# Patient Record
Sex: MALE | Race: WHITE | NOT HISPANIC OR LATINO | URBAN - METROPOLITAN AREA
[De-identification: names, ages, dates, MRNs, and addresses within clinical notes are randomized per-mention and may not be internally consistent; named-entity substitution may affect disease eponyms.]

---

## 2017-07-20 ENCOUNTER — EMERGENCY (EMERGENCY)
Facility: HOSPITAL | Age: 32
LOS: 1 days | Discharge: HOME | End: 2017-07-20
Admitting: EMERGENCY MEDICINE
Payer: MEDICAID

## 2017-07-20 VITALS
DIASTOLIC BLOOD PRESSURE: 75 MMHG | HEART RATE: 84 BPM | WEIGHT: 259.93 LBS | SYSTOLIC BLOOD PRESSURE: 155 MMHG | HEIGHT: 75 IN | OXYGEN SATURATION: 97 % | RESPIRATION RATE: 18 BRPM | TEMPERATURE: 103 F

## 2017-07-20 VITALS — RESPIRATION RATE: 17 BRPM | TEMPERATURE: 100 F | OXYGEN SATURATION: 98 % | HEART RATE: 72 BPM

## 2017-07-20 DIAGNOSIS — N17.9 ACUTE KIDNEY FAILURE, UNSPECIFIED: ICD-10-CM

## 2017-07-20 DIAGNOSIS — B34.9 VIRAL INFECTION, UNSPECIFIED: ICD-10-CM

## 2017-07-20 DIAGNOSIS — R50.9 FEVER, UNSPECIFIED: ICD-10-CM

## 2017-07-20 DIAGNOSIS — M54.2 CERVICALGIA: ICD-10-CM

## 2017-07-20 LAB
ALBUMIN SERPL ELPH-MCNC: 3.7 G/DL — SIGNIFICANT CHANGE UP (ref 3.4–5)
ALP SERPL-CCNC: 59 U/L — SIGNIFICANT CHANGE UP (ref 40–120)
ALT FLD-CCNC: 67 U/L — HIGH (ref 12–42)
ANION GAP SERPL CALC-SCNC: 7 MMOL/L — LOW (ref 9–16)
APPEARANCE UR: CLEAR — SIGNIFICANT CHANGE UP
AST SERPL-CCNC: 54 U/L — HIGH (ref 15–37)
BILIRUB SERPL-MCNC: 0.9 MG/DL — SIGNIFICANT CHANGE UP (ref 0.2–1.2)
BILIRUB UR-MCNC: NEGATIVE — SIGNIFICANT CHANGE UP
BUN SERPL-MCNC: 10 MG/DL — SIGNIFICANT CHANGE UP (ref 7–23)
CALCIUM SERPL-MCNC: 9.8 MG/DL — SIGNIFICANT CHANGE UP (ref 8.5–10.5)
CHLORIDE SERPL-SCNC: 103 MMOL/L — SIGNIFICANT CHANGE UP (ref 96–108)
CO2 SERPL-SCNC: 27 MMOL/L — SIGNIFICANT CHANGE UP (ref 22–31)
COLOR SPEC: YELLOW — SIGNIFICANT CHANGE UP
CREAT SERPL-MCNC: 1.51 MG/DL — HIGH (ref 0.5–1.3)
DIFF PNL FLD: NEGATIVE — SIGNIFICANT CHANGE UP
GLUCOSE SERPL-MCNC: 116 MG/DL — HIGH (ref 70–99)
GLUCOSE UR QL: NEGATIVE — SIGNIFICANT CHANGE UP
HCT VFR BLD CALC: 44.5 % — SIGNIFICANT CHANGE UP (ref 39–50)
HGB BLD-MCNC: 15.2 G/DL — SIGNIFICANT CHANGE UP (ref 13–17)
KETONES UR-MCNC: NEGATIVE — SIGNIFICANT CHANGE UP
LACTATE SERPL-SCNC: 0.8 MMOL/L — SIGNIFICANT CHANGE UP (ref 0.4–2)
LEUKOCYTE ESTERASE UR-ACNC: NEGATIVE — SIGNIFICANT CHANGE UP
MCHC RBC-ENTMCNC: 31.2 PG — SIGNIFICANT CHANGE UP (ref 27–34)
MCHC RBC-ENTMCNC: 34.2 G/DL — SIGNIFICANT CHANGE UP (ref 32–36)
MCV RBC AUTO: 91.4 FL — SIGNIFICANT CHANGE UP (ref 80–100)
NITRITE UR-MCNC: NEGATIVE — SIGNIFICANT CHANGE UP
PCP SPEC-MCNC: SIGNIFICANT CHANGE UP
PH UR: 7 — SIGNIFICANT CHANGE UP (ref 5–8)
PLATELET # BLD AUTO: 161 K/UL — SIGNIFICANT CHANGE UP (ref 150–400)
POTASSIUM SERPL-MCNC: 4.9 MMOL/L — SIGNIFICANT CHANGE UP (ref 3.5–5.3)
POTASSIUM SERPL-SCNC: 4.9 MMOL/L — SIGNIFICANT CHANGE UP (ref 3.5–5.3)
PROT SERPL-MCNC: 7.6 G/DL — SIGNIFICANT CHANGE UP (ref 6.4–8.2)
PROT UR-MCNC: NEGATIVE MG/DL — SIGNIFICANT CHANGE UP
RBC # BLD: 4.87 M/UL — SIGNIFICANT CHANGE UP (ref 4.2–5.8)
RBC # FLD: 14.3 % — SIGNIFICANT CHANGE UP (ref 10.3–16.9)
SODIUM SERPL-SCNC: 137 MMOL/L — SIGNIFICANT CHANGE UP (ref 132–145)
SP GR SPEC: <=1.005 — SIGNIFICANT CHANGE UP (ref 1–1.03)
UROBILINOGEN FLD QL: 0.2 E.U./DL — SIGNIFICANT CHANGE UP
WBC # BLD: 7 K/UL — SIGNIFICANT CHANGE UP (ref 3.8–10.5)
WBC # FLD AUTO: 7 K/UL — SIGNIFICANT CHANGE UP (ref 3.8–10.5)

## 2017-07-20 PROCEDURE — 99284 EMERGENCY DEPT VISIT MOD MDM: CPT | Mod: 25

## 2017-07-20 PROCEDURE — 71020: CPT | Mod: 26

## 2017-07-20 RX ORDER — OXYCODONE AND ACETAMINOPHEN 5; 325 MG/1; MG/1
1 TABLET ORAL ONCE
Qty: 0 | Refills: 0 | Status: DISCONTINUED | OUTPATIENT
Start: 2017-07-20 | End: 2017-07-20

## 2017-07-20 RX ORDER — IBUPROFEN 200 MG
800 TABLET ORAL ONCE
Qty: 0 | Refills: 0 | Status: COMPLETED | OUTPATIENT
Start: 2017-07-20 | End: 2017-07-20

## 2017-07-20 RX ORDER — ONDANSETRON 8 MG/1
1 TABLET, FILM COATED ORAL
Qty: 12 | Refills: 0 | OUTPATIENT
Start: 2017-07-20

## 2017-07-20 RX ORDER — ACETAMINOPHEN 500 MG
975 TABLET ORAL ONCE
Qty: 0 | Refills: 0 | Status: DISCONTINUED | OUTPATIENT
Start: 2017-07-20 | End: 2017-07-20

## 2017-07-20 RX ORDER — ONDANSETRON 8 MG/1
4 TABLET, FILM COATED ORAL ONCE
Qty: 0 | Refills: 0 | Status: COMPLETED | OUTPATIENT
Start: 2017-07-20 | End: 2017-07-20

## 2017-07-20 RX ORDER — SODIUM CHLORIDE 9 MG/ML
2000 INJECTION INTRAMUSCULAR; INTRAVENOUS; SUBCUTANEOUS ONCE
Qty: 0 | Refills: 0 | Status: COMPLETED | OUTPATIENT
Start: 2017-07-20 | End: 2017-07-20

## 2017-07-20 RX ORDER — IBUPROFEN 200 MG
1 TABLET ORAL
Qty: 20 | Refills: 0 | OUTPATIENT
Start: 2017-07-20 | End: 2017-08-19

## 2017-07-20 RX ADMIN — OXYCODONE AND ACETAMINOPHEN 1 TABLET(S): 5; 325 TABLET ORAL at 18:05

## 2017-07-20 RX ADMIN — SODIUM CHLORIDE 4000 MILLILITER(S): 9 INJECTION INTRAMUSCULAR; INTRAVENOUS; SUBCUTANEOUS at 16:18

## 2017-07-20 RX ADMIN — Medication 800 MILLIGRAM(S): at 17:59

## 2017-07-20 RX ADMIN — ONDANSETRON 4 MILLIGRAM(S): 8 TABLET, FILM COATED ORAL at 16:18

## 2017-07-20 RX ADMIN — Medication 800 MILLIGRAM(S): at 16:25

## 2017-07-20 NOTE — ED ADULT NURSE NOTE - OBJECTIVE STATEMENT
x1 day  states groin pain started radiating to lower back and traveling up. +nausea no emesis no diarrhea, decreased po intake. neck pain and headache, full rom no other neuro deficits noted.

## 2017-07-20 NOTE — ED PROVIDER NOTE - PHYSICAL EXAMINATION
Gen - NAD, comfortable in stretcher, non-toxic appearing, speaking in full sentences   Skin - warm, dry, intact, no rash noted   HEENT - AT/NC, PERRL, EOMI, no conjunctival injection, o/p clear with no erythema, edema, or exudate, uvula midline, airway patent, neck supple, no mid line tenderness or step off, no JVD or carotid bruits b/l, no palpable nodes, negative brudzinski's and kernig's   CV - S1S2, R/R/R  Resp - respiration non-labored, CTAB, symmetric bs b/l, no r/r/w  GI - NABS, soft, ND, NT, no rebound or guarding, no CVAT b/l   MS - w/w/p, no c/c/e, calves supple and NT  Neuro - AxOx3, no focal neuro deficits, CN II-XII grossly intact, ambulatory without gait disturbance

## 2017-07-20 NOTE — ED PROVIDER NOTE - MEDICAL DECISION MAKING DETAILS
pt with non specific sx with fever AFVSS at time of d/c, pt non-toxic appearing and hemodynamically stable, results, ddx, and f/u plans discussed with pt at bedside, d/c'd home to f/u with PMD, strict return precautions discussed, prompt return to ER for any worsening or new sx, pt verbalized understanding. pt with non specific sx with fever, non toxic appearing ,febrile to 102.7, +back/neck pain, but resolved s/p motrin and IVF, no meningismus signs, no source of infxn noted, no leukocytosis, AFVSS at time of d/c, pt non-toxic appearing and hemodynamically stable, results, ddx, and f/u plans discussed with pt at bedside, d/c'd home to f/u with PMD, strict return precautions discussed, prompt return to ER for any worsening or new sx, pt verbalized understanding.

## 2017-07-20 NOTE — ED ADULT NURSE NOTE - CHPI ED SYMPTOMS NEG
no vomiting/no numbness/no dizziness/no confusion/no loss of consciousness/no change in level of consciousness/no blurred vision

## 2017-07-20 NOTE — ED PROVIDER NOTE - OBJECTIVE STATEMENT
30 yo M with no known PMHx, presenting c/o fever, lower back pain, neck pain, and generalized malaise.  Pt reports binge drinking 3-4 days ago with hard liquors.  Noted lower back and neck discomfort the next day with associated fever (Tmax 102) at home and generalized weakness. Denies sore throat, cough, SOB, CP, palpitations, wheezing, abdominal pain, N/V/D/C, change in urinary/bowel function, dysuria, hematuria, flank pain, photophobia, phonophobia, rash, HA, and dizziness.  No recent travel or sick contact noted. Took tylenol around noon for fever

## 2017-07-20 NOTE — ED PROVIDER NOTE - CARE PLAN
Principal Discharge DX:	Febrile illness  Secondary Diagnosis:	Acute kidney injury  Secondary Diagnosis:	Viral syndrome

## 2017-07-20 NOTE — ED PROVIDER NOTE - DIAGNOSTIC INTERPRETATION
Xray (wet reads) interpreted by MILES HERNANDEZ   CXR - Cardiac silhouette, mediastinal and hilar contours wnl, no acute consolidation, infiltrate, effusion, or PTX. No bony abnormalities noted

## 2017-07-20 NOTE — ED ADULT TRIAGE NOTE - CHIEF COMPLAINT QUOTE
c/o fever, nausea, chills, body aches, weakness and fatigue x1 day subjective fever at home. Took tylenol at noon today. Decreased PO intake, denies any cough or sick contact.

## 2017-08-17 ENCOUNTER — EMERGENCY (EMERGENCY)
Facility: HOSPITAL | Age: 32
LOS: 1 days | Discharge: HOME | End: 2017-08-17
Admitting: EMERGENCY MEDICINE
Payer: MEDICAID

## 2017-08-17 VITALS
HEART RATE: 92 BPM | RESPIRATION RATE: 15 BRPM | OXYGEN SATURATION: 97 % | HEIGHT: 75 IN | SYSTOLIC BLOOD PRESSURE: 129 MMHG | WEIGHT: 259.93 LBS | DIASTOLIC BLOOD PRESSURE: 80 MMHG | TEMPERATURE: 101 F

## 2017-08-17 VITALS
TEMPERATURE: 100 F | RESPIRATION RATE: 18 BRPM | OXYGEN SATURATION: 96 % | DIASTOLIC BLOOD PRESSURE: 69 MMHG | SYSTOLIC BLOOD PRESSURE: 142 MMHG | HEART RATE: 90 BPM

## 2017-08-17 LAB
ALBUMIN SERPL ELPH-MCNC: 3.6 G/DL — SIGNIFICANT CHANGE UP (ref 3.4–5)
ALP SERPL-CCNC: 52 U/L — SIGNIFICANT CHANGE UP (ref 40–120)
ALT FLD-CCNC: 106 U/L — HIGH (ref 12–42)
ANION GAP SERPL CALC-SCNC: 9 MMOL/L — SIGNIFICANT CHANGE UP (ref 9–16)
APPEARANCE UR: CLEAR — SIGNIFICANT CHANGE UP
AST SERPL-CCNC: 76 U/L — HIGH (ref 15–37)
BASOPHILS NFR BLD AUTO: 0.1 % — SIGNIFICANT CHANGE UP (ref 0–2)
BILIRUB SERPL-MCNC: 0.4 MG/DL — SIGNIFICANT CHANGE UP (ref 0.2–1.2)
BILIRUB UR-MCNC: NEGATIVE — SIGNIFICANT CHANGE UP
BUN SERPL-MCNC: 14 MG/DL — SIGNIFICANT CHANGE UP (ref 7–23)
CALCIUM SERPL-MCNC: 10 MG/DL — SIGNIFICANT CHANGE UP (ref 8.5–10.5)
CHLORIDE SERPL-SCNC: 102 MMOL/L — SIGNIFICANT CHANGE UP (ref 96–108)
CK SERPL-CCNC: 2023 U/L — HIGH (ref 39–308)
CO2 SERPL-SCNC: 27 MMOL/L — SIGNIFICANT CHANGE UP (ref 22–31)
COLOR SPEC: YELLOW — SIGNIFICANT CHANGE UP
CREAT SERPL-MCNC: 1.55 MG/DL — HIGH (ref 0.5–1.3)
DIFF PNL FLD: (no result)
EOSINOPHIL NFR BLD AUTO: 0.3 % — SIGNIFICANT CHANGE UP (ref 0–6)
GLUCOSE SERPL-MCNC: 99 MG/DL — SIGNIFICANT CHANGE UP (ref 70–99)
GLUCOSE UR QL: NEGATIVE — SIGNIFICANT CHANGE UP
HCT VFR BLD CALC: 42.1 % — SIGNIFICANT CHANGE UP (ref 39–50)
HGB BLD-MCNC: 14.2 G/DL — SIGNIFICANT CHANGE UP (ref 13–17)
IMM GRANULOCYTES NFR BLD AUTO: 0.2 % — SIGNIFICANT CHANGE UP (ref 0–1.5)
KETONES UR-MCNC: NEGATIVE — SIGNIFICANT CHANGE UP
LEUKOCYTE ESTERASE UR-ACNC: NEGATIVE — SIGNIFICANT CHANGE UP
LYMPHOCYTES # BLD AUTO: 12.4 % — LOW (ref 13–44)
MAGNESIUM SERPL-MCNC: 2.1 MG/DL — SIGNIFICANT CHANGE UP (ref 1.6–2.6)
MCHC RBC-ENTMCNC: 31.1 PG — SIGNIFICANT CHANGE UP (ref 27–34)
MCHC RBC-ENTMCNC: 33.7 G/DL — SIGNIFICANT CHANGE UP (ref 32–36)
MCV RBC AUTO: 92.3 FL — SIGNIFICANT CHANGE UP (ref 80–100)
MONOCYTES NFR BLD AUTO: 11.1 % — SIGNIFICANT CHANGE UP (ref 2–14)
NEUTROPHILS NFR BLD AUTO: 75.9 % — SIGNIFICANT CHANGE UP (ref 43–77)
NITRITE UR-MCNC: NEGATIVE — SIGNIFICANT CHANGE UP
PCP SPEC-MCNC: SIGNIFICANT CHANGE UP
PH UR: 7.5 — SIGNIFICANT CHANGE UP (ref 5–8)
PLATELET # BLD AUTO: 146 K/UL — LOW (ref 150–400)
POTASSIUM SERPL-MCNC: 4.1 MMOL/L — SIGNIFICANT CHANGE UP (ref 3.5–5.3)
POTASSIUM SERPL-SCNC: 4.1 MMOL/L — SIGNIFICANT CHANGE UP (ref 3.5–5.3)
PROT SERPL-MCNC: 7.8 G/DL — SIGNIFICANT CHANGE UP (ref 6.4–8.2)
PROT UR-MCNC: 30 MG/DL
RBC # BLD: 4.56 M/UL — SIGNIFICANT CHANGE UP (ref 4.2–5.8)
RBC # FLD: 14.1 % — SIGNIFICANT CHANGE UP (ref 10.3–16.9)
SODIUM SERPL-SCNC: 138 MMOL/L — SIGNIFICANT CHANGE UP (ref 132–145)
SP GR SPEC: 1.01 — SIGNIFICANT CHANGE UP (ref 1–1.03)
UROBILINOGEN FLD QL: 0.2 E.U./DL — SIGNIFICANT CHANGE UP
WBC # BLD: 8.7 K/UL — SIGNIFICANT CHANGE UP (ref 3.8–10.5)
WBC # FLD AUTO: 8.7 K/UL — SIGNIFICANT CHANGE UP (ref 3.8–10.5)

## 2017-08-17 PROCEDURE — 99284 EMERGENCY DEPT VISIT MOD MDM: CPT | Mod: 25

## 2017-08-17 PROCEDURE — 71020: CPT | Mod: 26

## 2017-08-17 RX ORDER — SODIUM CHLORIDE 9 MG/ML
1000 INJECTION INTRAMUSCULAR; INTRAVENOUS; SUBCUTANEOUS ONCE
Qty: 0 | Refills: 0 | Status: COMPLETED | OUTPATIENT
Start: 2017-08-17 | End: 2017-08-17

## 2017-08-17 RX ORDER — KETOROLAC TROMETHAMINE 30 MG/ML
30 SYRINGE (ML) INJECTION ONCE
Qty: 0 | Refills: 0 | Status: DISCONTINUED | OUTPATIENT
Start: 2017-08-17 | End: 2017-08-17

## 2017-08-17 RX ADMIN — SODIUM CHLORIDE 2000 MILLILITER(S): 9 INJECTION INTRAMUSCULAR; INTRAVENOUS; SUBCUTANEOUS at 05:54

## 2017-08-17 RX ADMIN — SODIUM CHLORIDE 2000 MILLILITER(S): 9 INJECTION INTRAMUSCULAR; INTRAVENOUS; SUBCUTANEOUS at 05:11

## 2017-08-17 RX ADMIN — Medication 30 MILLIGRAM(S): at 05:11

## 2017-08-17 NOTE — ED PROVIDER NOTE - MEDICAL DECISION MAKING DETAILS
pt with uri, viral syndrome; labs with decreased lymphocytes; slightly elevated ck- cr 1.8; pt given 2 L and is urinating normally. advised pmd f/u and supportive care. cxr normal

## 2017-08-17 NOTE — ED ADULT NURSE NOTE - OBJECTIVE STATEMENT
friday   cough brown phlegm   chills 100.0-102  eohedra x 2 weeks   headadhe   no N/V/D. Pt came in c/o fever/chills between 100.0-100.2 for the past week. Pt reports productive cough with brown phlegm. Pt denies any N/V/D, no CP/SOB. Pt reports lower back pain radiating to groin and neck 5/10. Pt reports using new weight loss drug called Ephedra for the past x2 weeks and thinks it may be causing muscle/body aches.

## 2017-08-17 NOTE — ED PROVIDER NOTE - OBJECTIVE STATEMENT
32 yo M with no known PMHx, presenting c/o fever, lower back pain, neck pain, and generalized malaise; +cough.  Pt reports binge drinking 3-4 days ago with hard liquors.  Noted lower back and neck discomfort the next day with associated fever (Tmax 102) at home and generalized weakness. Denies sore throat, SOB, CP, palpitations, wheezing, abdominal pain, N/V/D/C, change in urinary/bowel function, dysuria, hematuria, flank pain, photophobia, phonophobia, rash, HA, and dizziness.  No recent travel or sick contact noted.  Pt also reports taking ephedra and working out 2 times daily.

## 2017-08-17 NOTE — ED ADULT TRIAGE NOTE - CHIEF COMPLAINT QUOTE
Pt presents to ED with c/o fatigue, dehydration, dry cough and fevers x several days. Patient has been tolerating PO- alternating between pedialyte and water. Reports taking ephedra recently, walking around in the heat and working out 2 x daily.

## 2017-08-18 ENCOUNTER — EMERGENCY (EMERGENCY)
Facility: HOSPITAL | Age: 32
LOS: 1 days | Discharge: HOME | End: 2017-08-18
Attending: EMERGENCY MEDICINE | Admitting: EMERGENCY MEDICINE
Payer: MEDICAID

## 2017-08-18 VITALS
HEART RATE: 95 BPM | OXYGEN SATURATION: 95 % | SYSTOLIC BLOOD PRESSURE: 144 MMHG | DIASTOLIC BLOOD PRESSURE: 98 MMHG | RESPIRATION RATE: 18 BRPM | TEMPERATURE: 100 F

## 2017-08-18 DIAGNOSIS — M54.5 LOW BACK PAIN: ICD-10-CM

## 2017-08-18 DIAGNOSIS — R05 COUGH: ICD-10-CM

## 2017-08-18 DIAGNOSIS — Z79.1 LONG TERM (CURRENT) USE OF NON-STEROIDAL ANTI-INFLAMMATORIES (NSAID): ICD-10-CM

## 2017-08-18 DIAGNOSIS — Z79.899 OTHER LONG TERM (CURRENT) DRUG THERAPY: ICD-10-CM

## 2017-08-18 DIAGNOSIS — J18.9 PNEUMONIA, UNSPECIFIED ORGANISM: ICD-10-CM

## 2017-08-18 LAB
ALBUMIN SERPL ELPH-MCNC: 3 G/DL — LOW (ref 3.4–5)
ALP SERPL-CCNC: 35 U/L — LOW (ref 40–120)
ALT FLD-CCNC: 59 U/L — HIGH (ref 12–42)
ANION GAP SERPL CALC-SCNC: 9 MMOL/L — SIGNIFICANT CHANGE UP (ref 9–16)
AST SERPL-CCNC: 50 U/L — HIGH (ref 15–37)
BASOPHILS NFR BLD AUTO: 0 % — SIGNIFICANT CHANGE UP (ref 0–2)
BILIRUB SERPL-MCNC: 0.4 MG/DL — SIGNIFICANT CHANGE UP (ref 0.2–1.2)
BUN SERPL-MCNC: 11 MG/DL — SIGNIFICANT CHANGE UP (ref 7–23)
CALCIUM SERPL-MCNC: 9.4 MG/DL — SIGNIFICANT CHANGE UP (ref 8.5–10.5)
CHLORIDE SERPL-SCNC: 103 MMOL/L — SIGNIFICANT CHANGE UP (ref 96–108)
CO2 SERPL-SCNC: 25 MMOL/L — SIGNIFICANT CHANGE UP (ref 22–31)
CREAT SERPL-MCNC: 1.42 MG/DL — HIGH (ref 0.5–1.3)
EOSINOPHIL NFR BLD AUTO: 0 % — SIGNIFICANT CHANGE UP (ref 0–6)
GLUCOSE SERPL-MCNC: 101 MG/DL — HIGH (ref 70–99)
HCT VFR BLD CALC: 40.4 % — SIGNIFICANT CHANGE UP (ref 39–50)
HGB BLD-MCNC: 13.5 G/DL — SIGNIFICANT CHANGE UP (ref 13–17)
HIV 1 & 2 AB SERPL IA.RAPID: SIGNIFICANT CHANGE UP
IMM GRANULOCYTES NFR BLD AUTO: 23 % — SIGNIFICANT CHANGE UP (ref 0–1.5)
LYMPHOCYTES # BLD AUTO: 12 % — SIGNIFICANT CHANGE UP (ref 13–44)
MCHC RBC-ENTMCNC: 30.8 PG — SIGNIFICANT CHANGE UP (ref 27–34)
MCHC RBC-ENTMCNC: 33.4 G/DL — SIGNIFICANT CHANGE UP (ref 32–36)
MCV RBC AUTO: 92.2 FL — SIGNIFICANT CHANGE UP (ref 80–100)
MONOCYTES NFR BLD AUTO: 3 % — SIGNIFICANT CHANGE UP (ref 2–14)
NEUTROPHILS NFR BLD AUTO: 84.2 % — HIGH (ref 43–77)
PLATELET # BLD AUTO: 157 K/UL — SIGNIFICANT CHANGE UP (ref 150–400)
POTASSIUM SERPL-MCNC: 3.8 MMOL/L — SIGNIFICANT CHANGE UP (ref 3.5–5.3)
POTASSIUM SERPL-SCNC: 3.8 MMOL/L — SIGNIFICANT CHANGE UP (ref 3.5–5.3)
PROT SERPL-MCNC: 7.1 G/DL — SIGNIFICANT CHANGE UP (ref 6.4–8.2)
RBC # BLD: 4.38 M/UL — SIGNIFICANT CHANGE UP (ref 4.2–5.8)
RBC # FLD: 14.2 % — SIGNIFICANT CHANGE UP (ref 10.3–16.9)
SODIUM SERPL-SCNC: 137 MMOL/L — SIGNIFICANT CHANGE UP (ref 132–145)
WBC # BLD: 7.2 K/UL — SIGNIFICANT CHANGE UP (ref 3.8–10.5)
WBC # FLD AUTO: 7.2 K/UL — SIGNIFICANT CHANGE UP (ref 3.8–10.5)

## 2017-08-18 PROCEDURE — 99284 EMERGENCY DEPT VISIT MOD MDM: CPT

## 2017-08-18 RX ORDER — SODIUM CHLORIDE 9 MG/ML
1000 INJECTION INTRAMUSCULAR; INTRAVENOUS; SUBCUTANEOUS ONCE
Qty: 0 | Refills: 0 | Status: COMPLETED | OUTPATIENT
Start: 2017-08-18 | End: 2017-08-18

## 2017-08-18 RX ADMIN — SODIUM CHLORIDE 1000 MILLILITER(S): 9 INJECTION INTRAMUSCULAR; INTRAVENOUS; SUBCUTANEOUS at 18:02

## 2017-08-18 RX ADMIN — Medication 100 MILLIGRAM(S): at 18:01

## 2017-08-18 NOTE — ED PROVIDER NOTE - MEDICAL DECISION MAKING DETAILS
Pt presents with worsening productive cough since last night. Will give fluids, IV Levaquin, and Tessalon Pearle. Labs ordered. Re-evaluate. Pt presents with worsening productive cough since last night. Will treat as PNA given recent over-read on pt's CXR.  Will give fluids, IV Levaquin, and Tessalon Pearle. Labs ordered. Re-evaluate.    Labs improving.  Cr and LFTs improving and may be elevated at baseline given pt's body habitus.  Clinically stable for out pt treatment.  Rx sent in.  Emergent return precautions discussed.  PCP f/u instructed.

## 2017-08-18 NOTE — ED PROVIDER NOTE - CONSTITUTIONAL, MLM
normal... Well appearing, well nourished, awake, alert, oriented to person, place, time/situation and in no apparent distress. Extremely muscular, awake, alert, oriented to person, place, time/situation and in no apparent distress. Well developed/muscular, awake, alert, oriented to person, place, time/situation and in no apparent distress.

## 2017-08-18 NOTE — ED PROVIDER NOTE - OBJECTIVE STATEMENT
32 yo M with no PMHx seen in this ED yesterday for cough, back pain and fever. Pt was diagnosed with viral syndrome but chest x-ray was read by radiologist the next day as early pneumonia. Pt is complaining of worsening productive cough with brown phlegm since last night after discharge. +Subjective fever, chills, low back pain and HA. Pt called PMD who is not in town and referred to ED.

## 2017-08-18 NOTE — ED PROVIDER NOTE - RESPIRATORY, MLM
Mild rhonchi on left side > right side. Mild rhonchi in base, left side > right side.  No tachypnea or hypoxia.  Normal RR.

## 2017-08-21 DIAGNOSIS — Z79.899 OTHER LONG TERM (CURRENT) DRUG THERAPY: ICD-10-CM

## 2017-08-21 DIAGNOSIS — M62.82 RHABDOMYOLYSIS: ICD-10-CM

## 2017-08-21 DIAGNOSIS — M54.5 LOW BACK PAIN: ICD-10-CM

## 2017-08-21 DIAGNOSIS — R50.9 FEVER, UNSPECIFIED: ICD-10-CM

## 2017-08-21 DIAGNOSIS — B34.9 VIRAL INFECTION, UNSPECIFIED: ICD-10-CM

## 2017-08-21 DIAGNOSIS — Z79.1 LONG TERM (CURRENT) USE OF NON-STEROIDAL ANTI-INFLAMMATORIES (NSAID): ICD-10-CM

## 2017-08-23 NOTE — ED ADULT TRIAGE NOTE - ACCOMPANIED BY
Call placed to pt regarding test result of ultrasound of aorta  Pt verbalized understanding of results and is relieved   Self

## 2019-06-24 NOTE — ED PROVIDER NOTE - ENMT NEGATIVE STATEMENT, MLM
Ears: no ear pain and no hearing problems.Nose: no nasal congestion and no nasal drainage.Mouth/Throat: no dysphagia, no hoarseness and no throat pain.Neck: no lumps, no pain, no stiffness and no swollen glands. 32

## 2019-09-18 NOTE — ED ADULT NURSE NOTE - HARM RISK FACTORS
79 yo M with copd exacerbation, doubt acs, doubt pna  -basic labs, coags, blood gas, trop, ckmb, bnp, cxr, ekg, iv, solu-medrol, combinebs, mag 2g, monitor  -f/u results, reeval
no

## 2021-02-11 NOTE — ED ADULT NURSE NOTE - QUALITY
General:	NAD    ENMT:	No scleral jaundice     Respiratory and Thorax: Patent airways.   	  Gastrointestinal:	Soft, non distended, no rebound. Mild epigastric tenderness.    Musculoskeletal:	warm to touch. aching

## 2022-03-08 NOTE — ED ADULT TRIAGE NOTE - NS AS WEIGHT METHOD - PEDI/INFANT
----- Message from Rojas Greene DO sent at 3/3/2022 12:54 PM EST -----  The chest x-ray revealed no abnormalities. stated

## 2023-02-23 NOTE — ED ADULT NURSE NOTE - CAS TRG GENERAL NORM CIRC DET
breathing is unlabored without accessory muscle use, normal breath sounds Strong peripheral pulses/Capillary refill less/equal to 2 seconds none

## 2024-02-12 NOTE — ED ADULT TRIAGE NOTE - BMI (KG/M2)
Chief Complaint: Shortness of breath    Interval Hx: Patient seen and examined this AM. Reports improvement in her breathing since admission. No dyspnea at rest. Remains hypoxic but less so than compared to admission, weaned from 5L/min to 3L/min. Generally weak and deconditioned. No chest pain or tightness. No dizziness, lightheadedness or palpitations. No abdominal complaints. No urinary complaints.     ROS: Multi system review is comprehensively negative x 10 systems except as above    Vitals:  Afebrile  BP 90s-110s/60s   HR 80s-90s  RR 16-18   O2 94-95% on O2 3L/min via NC    Exam:  Constitutional: No acute distress  HEENT: NCAT PERRL EOMI MMM clear oropharynx  Neck: Supple, no JVD  CVS: S1 and S2, irregular, rate ~90, 2/6 REBECA   Chest: Normal resp effort at rest, lungs clear  Abd: +BS, soft NT ND   Ext: B/L LE edema  Skin: Warm, dry  Psych: Mood & affect appropriate  Neurological: Alert, no focal deficits    Labs:                      9.9    3.29  )--------( 170              30.8       128  |  95  |  31  ---------------------<  157  6.2   |  28  |  1.48    Ca  8.6      Mg  2.1    Phos 3.0       TPro  6.6  /  Alb  3.3  /  TBili  0.6  /  DBili  0.2   /  AST  25  /  ALT  19  /  AlkPhos  110    Troponin negative   proBNP 7548    ABG 2/11 8:47   pH 7.29  /  pCO2: 61  /  pO2: 106  /   HCO3: 29  /  Base Excess: 1.2  /   SaO2: 98        UA 2/10: Yellow, clear, neg ketone, neg nitrite, trace LE, 2 WBC, 0 RBC, bact neg    Micro:  COVID19 PCR 2/10: Negative  Flu PCR 2/10: Negative  RSV PCR 2/10: Negative  Urine culture 2/10: Negative  Blood culture 2/10: Negative    Imaging:  CT chest WO 2/10: Bibasilar compressive atelectasis. No pulmonary consolidation or mass. There are enlarging moderate to large bilateral pleural effusions. No lymphadenopathy. There is enlargement of the main pulmonary artery spanning 3.4cm, consistent with pulmonary arterial hypertension. Severe arterial vascular calcification. There is severe cardiomegaly with four-chamber enlargement.  There are permanent pacemaker with lead tips in the right ventricle. Severe coronary arterial calcification.    CXR 2/10: Bilateral pleural effusions with adjacent atelectasis versus pneumonia at the left base.    Cardiac Testing:  Tele 2/10: Afib rate     EKG 2/10: Afib rate 100s    Prior visit cardiac testing   TTE 9/11:  Limited study to assess tricuspid insufficiency and pulmonary pressure. Severe (4+) tricuspid valve regurgitation is present. Severe pulmonary hypertension. The left ventricle is normal in size, paradoxical septal motion The interventricular septum appears flattened consistent with an RV pressure/volume overload. An apically displaced papillary muscle is noted. Estimated left ventricular ejection fraction is 50-55%. The right atrium appears severely dilated. A device wire is seen in the RV and RA. The right ventricle is moderately dilated with decreased contractility.    LHC 9/6: Mild diffuse atherosclerosis with moderate severe disease of small caliber Cx in a nondominant territory. Elevated right sided filling pressures in setting of severe TR with normal left sided filling pressures. Adequate perfusion.    TTE 8/29: Mild to mod MR. Severe AS. Severe TR. Mild to mod SC. Severe pHTN. Severely dilated LA. LV systolic function mildly impaired; segmental wall motion abnormalities noted. Mild concentric LVH. LVEF 45%. The interventricular septum appears flattened consistent with an RV pressure/volume overload. RA severely dilated. RV with moderate dilation, diffuse hypokinesis, and depression of contractility based on TAPSE. A device wire is seen in the RV and RA. IVC is dilated and not collapsing with inspiration.    Meds:  MEDICATIONS  (STANDING):  albuterol/ipratropium for Nebulization 3 milliLiter(s) Nebulizer every 6 hours  allopurinol 100 milliGRAM(s) Oral daily  atorvastatin 20 milliGRAM(s) Oral at bedtime  escitalopram 5 milliGRAM(s) Oral daily  furosemide   Injectable 40 milliGRAM(s) IV Push daily  levothyroxine 125 MICROGram(s) Oral daily  methylPREDNISolone sodium succinate Injectable 20 milliGRAM(s) IV Push every 8 hours  metoprolol succinate ER 25 milliGRAM(s) Oral at bedtime  midodrine. 2.5 milliGRAM(s) Oral three times a day  multivitamin 1 Tablet(s) Oral daily  rivaroxaban 10 milliGRAM(s) Oral daily  thiamine 100 milliGRAM(s) Oral daily    MEDICATIONS  (PRN):  acetaminophen     Tablet .. 650 milliGRAM(s) Oral every 6 hours PRN Mild Pain (1 - 3)  aluminum hydroxide/magnesium hydroxide/simethicone Suspension 30 milliLiter(s) Oral every 4 hours PRN Dyspepsia  melatonin 3 milliGRAM(s) Oral at bedtime PRN Insomnia  ondansetron Injectable 4 milliGRAM(s) IV Push every 6 hours PRN Nausea and/or Vomiting   Chief Complaint: Shortness of breath    Interval Hx: Patient seen and examined this AM. Reports improvement in her breathing since admission. No dyspnea at rest. Remains hypoxic but less so than compared to admission, weaned from 5L/min to 3L/min. Generally weak and deconditioned. No chest pain or tightness. No dizziness, lightheadedness or palpitations. No abdominal pain, nausea or vomiting but does report constipation. Ordered for bowel regimen. No urinary complaints. No other symptoms.     ROS: Multi system review is comprehensively negative x 10 systems except as above    Vitals:  Afebrile  BP 90s-110s/60s   HR 80s-90s  RR 16-18   O2 94-95% on O2 3L/min via NC    Exam:  Constitutional: No acute distress  HEENT: NCAT PERRL EOMI MMM clear oropharynx  Neck: Supple, no JVD  CVS: S1 and S2, irregular, rate ~90, 2/6 REBECA   Chest: Normal resp effort at rest, lungs clear  Abd: +BS, soft NT ND   Ext: B/L LE edema  Skin: Warm, dry  Psych: Mood & affect appropriate  Neurological: Alert, no focal deficits    Labs:                      9.9    3.29  )--------( 170              30.8       128  |  95  |  31  ---------------------<  157  6.2   |  28  |  1.48    Ca  8.6      Mg  2.1    Phos 3.0       TPro  6.6  /  Alb  3.3  /  TBili  0.6  /  DBili  0.2   /  AST  25  /  ALT  19  /  AlkPhos  110    Troponin negative   proBNP 7548    ABG 2/11 8:47   pH 7.29  /  pCO2: 61  /  pO2: 106  /   HCO3: 29  /  Base Excess: 1.2  /   SaO2: 98        UA 2/10: Yellow, clear, neg ketone, neg nitrite, trace LE, 2 WBC, 0 RBC, bact neg    Micro:  COVID19 PCR 2/10: Negative  Flu PCR 2/10: Negative  RSV PCR 2/10: Negative  Urine culture 2/10: Negative  Blood culture 2/10: Negative    Imaging:  CT chest WO 2/10: Bibasilar compressive atelectasis. No pulmonary consolidation or mass. There are enlarging moderate to large bilateral pleural effusions. No lymphadenopathy. There is enlargement of the main pulmonary artery spanning 3.4cm, consistent with pulmonary arterial hypertension. Severe arterial vascular calcification. There is severe cardiomegaly with four-chamber enlargement.  There are permanent pacemaker with lead tips in the right ventricle. Severe coronary arterial calcification.    CXR 2/10: Bilateral pleural effusions with adjacent atelectasis versus pneumonia at the left base.    Cardiac Testing:  PPM interrogation 2/12: Underlying rhythm aFib. Normal functioning single chamber PPM with battery longevity 5.6 years. Afib with V-pacing 19%, overall rate histogram is acceptable, brief RVR noted. No setting change made.    Tele 2/10: Afib rate     EKG 2/10: Afib rate 100s    Prior visit cardiac testing   TTE 9/11:  Limited study to assess tricuspid insufficiency and pulmonary pressure. Severe (4+) tricuspid valve regurgitation is present. Severe pulmonary hypertension. The left ventricle is normal in size, paradoxical septal motion The interventricular septum appears flattened consistent with an RV pressure/volume overload. An apically displaced papillary muscle is noted. Estimated left ventricular ejection fraction is 50-55%. The right atrium appears severely dilated. A device wire is seen in the RV and RA. The right ventricle is moderately dilated with decreased contractility.    LHC 9/6: Mild diffuse atherosclerosis with moderate severe disease of small caliber Cx in a nondominant territory. Elevated right sided filling pressures in setting of severe TR with normal left sided filling pressures. Adequate perfusion.    TTE 8/29: Mild to mod MR. Severe AS. Severe TR. Mild to mod SC. Severe pHTN. Severely dilated LA. LV systolic function mildly impaired; segmental wall motion abnormalities noted. Mild concentric LVH. LVEF 45%. The interventricular septum appears flattened consistent with an RV pressure/volume overload. RA severely dilated. RV with moderate dilation, diffuse hypokinesis, and depression of contractility based on TAPSE. A device wire is seen in the RV and RA. IVC is dilated and not collapsing with inspiration.    Meds:  MEDICATIONS  (STANDING):  albuterol/ipratropium for Nebulization 3 milliLiter(s) Nebulizer every 6 hours  allopurinol 100 milliGRAM(s) Oral daily  atorvastatin 20 milliGRAM(s) Oral at bedtime  escitalopram 5 milliGRAM(s) Oral daily  furosemide   Injectable 40 milliGRAM(s) IV Push daily  levothyroxine 125 MICROGram(s) Oral daily  methylPREDNISolone sodium succinate Injectable 20 milliGRAM(s) IV Push every 8 hours  metoprolol succinate ER 25 milliGRAM(s) Oral at bedtime  midodrine. 2.5 milliGRAM(s) Oral three times a day  multivitamin 1 Tablet(s) Oral daily  rivaroxaban 10 milliGRAM(s) Oral daily  thiamine 100 milliGRAM(s) Oral daily    MEDICATIONS  (PRN):  acetaminophen     Tablet .. 650 milliGRAM(s) Oral every 6 hours PRN Mild Pain (1 - 3)  aluminum hydroxide/magnesium hydroxide/simethicone Suspension 30 milliLiter(s) Oral every 4 hours PRN Dyspepsia  melatonin 3 milliGRAM(s) Oral at bedtime PRN Insomnia  ondansetron Injectable 4 milliGRAM(s) IV Push every 6 hours PRN Nausea and/or Vomiting   32.5